# Patient Record
Sex: FEMALE | Race: WHITE | Employment: FULL TIME | URBAN - METROPOLITAN AREA
[De-identification: names, ages, dates, MRNs, and addresses within clinical notes are randomized per-mention and may not be internally consistent; named-entity substitution may affect disease eponyms.]

---

## 2019-11-01 ENCOUNTER — HOSPITAL ENCOUNTER (EMERGENCY)
Age: 24
Discharge: HOME | End: 2019-11-01
Attending: EMERGENCY MEDICINE
Payer: COMMERCIAL

## 2019-11-01 ENCOUNTER — APPOINTMENT (OUTPATIENT)
Dept: CT IMAGING | Age: 24
End: 2019-11-01
Payer: COMMERCIAL

## 2019-11-01 VITALS
HEART RATE: 82 BPM | OXYGEN SATURATION: 99 % | WEIGHT: 132 LBS | SYSTOLIC BLOOD PRESSURE: 124 MMHG | RESPIRATION RATE: 18 BRPM | HEIGHT: 62 IN | BODY MASS INDEX: 24.29 KG/M2 | TEMPERATURE: 97.8 F | DIASTOLIC BLOOD PRESSURE: 92 MMHG

## 2019-11-01 DIAGNOSIS — N20.0 KIDNEY STONE: Primary | ICD-10-CM

## 2019-11-01 LAB
ALBUMIN SERPL-MCNC: 4.8 G/DL (ref 3.5–4.6)
ALP BLD-CCNC: 59 U/L (ref 40–130)
ALT SERPL-CCNC: 9 U/L (ref 0–33)
ANION GAP SERPL CALCULATED.3IONS-SCNC: 15 MEQ/L (ref 9–15)
AST SERPL-CCNC: 14 U/L (ref 0–35)
BACTERIA: ABNORMAL /HPF
BASOPHILS ABSOLUTE: 0.1 K/UL (ref 0–0.2)
BASOPHILS RELATIVE PERCENT: 0.7 %
BILIRUB SERPL-MCNC: 0.7 MG/DL (ref 0.2–0.7)
BILIRUBIN URINE: ABNORMAL
BLOOD, URINE: ABNORMAL
BUN BLDV-MCNC: 9 MG/DL (ref 6–20)
CALCIUM SERPL-MCNC: 9.7 MG/DL (ref 8.5–9.9)
CHLORIDE BLD-SCNC: 100 MEQ/L (ref 95–107)
CLARITY: ABNORMAL
CO2: 27 MEQ/L (ref 20–31)
COLOR: ABNORMAL
CREAT SERPL-MCNC: 0.82 MG/DL (ref 0.5–0.9)
EOSINOPHILS ABSOLUTE: 0.1 K/UL (ref 0–0.7)
EOSINOPHILS RELATIVE PERCENT: 1.1 %
EPITHELIAL CELLS, UA: ABNORMAL /HPF (ref 0–5)
GFR AFRICAN AMERICAN: >60
GFR NON-AFRICAN AMERICAN: >60
GLOBULIN: 2.9 G/DL (ref 2.3–3.5)
GLUCOSE BLD-MCNC: 107 MG/DL (ref 70–99)
GLUCOSE URINE: NEGATIVE MG/DL
HCT VFR BLD CALC: 42.4 % (ref 37–47)
HEMOGLOBIN: 15.1 G/DL (ref 12–16)
HYALINE CASTS: ABNORMAL /HPF (ref 0–5)
KETONES, URINE: NEGATIVE MG/DL
LEUKOCYTE ESTERASE, URINE: ABNORMAL
LYMPHOCYTES ABSOLUTE: 3 K/UL (ref 1–4.8)
LYMPHOCYTES RELATIVE PERCENT: 34.5 %
MCH RBC QN AUTO: 32.2 PG (ref 27–31.3)
MCHC RBC AUTO-ENTMCNC: 35.5 % (ref 33–37)
MCV RBC AUTO: 90.5 FL (ref 82–100)
MONOCYTES ABSOLUTE: 0.6 K/UL (ref 0.2–0.8)
MONOCYTES RELATIVE PERCENT: 6.9 %
NEUTROPHILS ABSOLUTE: 5 K/UL (ref 1.4–6.5)
NEUTROPHILS RELATIVE PERCENT: 56.8 %
NITRITE, URINE: POSITIVE
PDW BLD-RTO: 11.9 % (ref 11.5–14.5)
PH UA: 5.5 (ref 5–9)
PLATELET # BLD: 227 K/UL (ref 130–400)
POTASSIUM SERPL-SCNC: 3.7 MEQ/L (ref 3.4–4.9)
PROTEIN UA: 30 MG/DL
RBC # BLD: 4.69 M/UL (ref 4.2–5.4)
RBC UA: ABNORMAL /HPF (ref 0–5)
SODIUM BLD-SCNC: 142 MEQ/L (ref 135–144)
SPECIFIC GRAVITY UA: 1.02 (ref 1–1.03)
TOTAL PROTEIN: 7.7 G/DL (ref 6.3–8)
URINE REFLEX TO CULTURE: YES
UROBILINOGEN, URINE: 1 E.U./DL
WBC # BLD: 8.8 K/UL (ref 4.8–10.8)
WBC UA: ABNORMAL /HPF (ref 0–5)

## 2019-11-01 PROCEDURE — 85025 COMPLETE CBC W/AUTO DIFF WBC: CPT

## 2019-11-01 PROCEDURE — 6370000000 HC RX 637 (ALT 250 FOR IP): Performed by: EMERGENCY MEDICINE

## 2019-11-01 PROCEDURE — 36415 COLL VENOUS BLD VENIPUNCTURE: CPT

## 2019-11-01 PROCEDURE — 96375 TX/PRO/DX INJ NEW DRUG ADDON: CPT

## 2019-11-01 PROCEDURE — 6360000002 HC RX W HCPCS: Performed by: EMERGENCY MEDICINE

## 2019-11-01 PROCEDURE — 81001 URINALYSIS AUTO W/SCOPE: CPT

## 2019-11-01 PROCEDURE — 80053 COMPREHEN METABOLIC PANEL: CPT

## 2019-11-01 PROCEDURE — 99284 EMERGENCY DEPT VISIT MOD MDM: CPT

## 2019-11-01 PROCEDURE — 74176 CT ABD & PELVIS W/O CONTRAST: CPT

## 2019-11-01 PROCEDURE — 96374 THER/PROPH/DIAG INJ IV PUSH: CPT

## 2019-11-01 PROCEDURE — 87086 URINE CULTURE/COLONY COUNT: CPT

## 2019-11-01 RX ORDER — HYDROCODONE BITARTRATE AND ACETAMINOPHEN 5; 325 MG/1; MG/1
2 TABLET ORAL ONCE
Status: COMPLETED | OUTPATIENT
Start: 2019-11-01 | End: 2019-11-01

## 2019-11-01 RX ORDER — BUPROPION HYDROCHLORIDE 150 MG/1
150 TABLET, EXTENDED RELEASE ORAL DAILY
COMMUNITY

## 2019-11-01 RX ORDER — TAMSULOSIN HYDROCHLORIDE 0.4 MG/1
0.4 CAPSULE ORAL ONCE
Status: COMPLETED | OUTPATIENT
Start: 2019-11-01 | End: 2019-11-01

## 2019-11-01 RX ORDER — ONDANSETRON 2 MG/ML
4 INJECTION INTRAMUSCULAR; INTRAVENOUS ONCE
Status: COMPLETED | OUTPATIENT
Start: 2019-11-01 | End: 2019-11-01

## 2019-11-01 RX ORDER — TAMSULOSIN HYDROCHLORIDE 0.4 MG/1
0.4 CAPSULE ORAL DAILY
Qty: 5 CAPSULE | Refills: 0 | Status: SHIPPED | OUTPATIENT
Start: 2019-11-01 | End: 2019-11-11 | Stop reason: ALTCHOICE

## 2019-11-01 RX ORDER — HYDROCODONE BITARTRATE AND ACETAMINOPHEN 5; 325 MG/1; MG/1
1 TABLET ORAL EVERY 6 HOURS PRN
Qty: 20 TABLET | Refills: 0 | Status: SHIPPED | OUTPATIENT
Start: 2019-11-01 | End: 2019-11-06

## 2019-11-01 RX ORDER — KETOROLAC TROMETHAMINE 30 MG/ML
30 INJECTION, SOLUTION INTRAMUSCULAR; INTRAVENOUS ONCE
Status: COMPLETED | OUTPATIENT
Start: 2019-11-01 | End: 2019-11-01

## 2019-11-01 RX ADMIN — ONDANSETRON 4 MG: 2 INJECTION INTRAMUSCULAR; INTRAVENOUS at 03:43

## 2019-11-01 RX ADMIN — HYDROCODONE BITARTRATE AND ACETAMINOPHEN 2 TABLET: 5; 325 TABLET ORAL at 04:59

## 2019-11-01 RX ADMIN — KETOROLAC TROMETHAMINE 30 MG: 30 INJECTION, SOLUTION INTRAMUSCULAR; INTRAVENOUS at 03:43

## 2019-11-01 RX ADMIN — TAMSULOSIN HYDROCHLORIDE 0.4 MG: 0.4 CAPSULE ORAL at 04:59

## 2019-11-01 SDOH — HEALTH STABILITY: MENTAL HEALTH: HOW OFTEN DO YOU HAVE A DRINK CONTAINING ALCOHOL?: NEVER

## 2019-11-01 ASSESSMENT — PAIN DESCRIPTION - DESCRIPTORS
DESCRIPTORS: SHARP
DESCRIPTORS: SHARP

## 2019-11-01 ASSESSMENT — PAIN DESCRIPTION - PAIN TYPE
TYPE: ACUTE PAIN
TYPE: ACUTE PAIN

## 2019-11-01 ASSESSMENT — PAIN DESCRIPTION - ORIENTATION
ORIENTATION: RIGHT
ORIENTATION: RIGHT

## 2019-11-01 ASSESSMENT — ENCOUNTER SYMPTOMS
SORE THROAT: 0
COUGH: 0
EYE DISCHARGE: 0
WHEEZING: 0
VOMITING: 0
ABDOMINAL PAIN: 0
ABDOMINAL DISTENTION: 0
CHEST TIGHTNESS: 0
SHORTNESS OF BREATH: 0
PHOTOPHOBIA: 0

## 2019-11-01 ASSESSMENT — PAIN SCALES - GENERAL
PAINLEVEL_OUTOF10: 8
PAINLEVEL_OUTOF10: 3
PAINLEVEL_OUTOF10: 3
PAINLEVEL_OUTOF10: 8

## 2019-11-01 ASSESSMENT — PAIN DESCRIPTION - LOCATION
LOCATION: FLANK
LOCATION: FLANK

## 2019-11-02 LAB — URINE CULTURE, ROUTINE: NORMAL

## 2019-11-06 ENCOUNTER — TELEPHONE (OUTPATIENT)
Dept: UROLOGY | Age: 24
End: 2019-11-06

## 2019-11-06 DIAGNOSIS — N20.0 KIDNEY STONE: Primary | ICD-10-CM

## 2019-11-11 ENCOUNTER — OFFICE VISIT (OUTPATIENT)
Dept: UROLOGY | Age: 24
End: 2019-11-11
Payer: COMMERCIAL

## 2019-11-11 ENCOUNTER — HOSPITAL ENCOUNTER (OUTPATIENT)
Dept: GENERAL RADIOLOGY | Age: 24
Discharge: HOME | End: 2019-11-13
Payer: COMMERCIAL

## 2019-11-11 VITALS
BODY MASS INDEX: 23.04 KG/M2 | WEIGHT: 130 LBS | HEART RATE: 83 BPM | HEIGHT: 63 IN | SYSTOLIC BLOOD PRESSURE: 108 MMHG | DIASTOLIC BLOOD PRESSURE: 68 MMHG

## 2019-11-11 DIAGNOSIS — N20.0 KIDNEY STONE: Primary | ICD-10-CM

## 2019-11-11 DIAGNOSIS — N20.0 KIDNEY STONE: ICD-10-CM

## 2019-11-11 LAB — PARATHYROID HORMONE INTACT: 20.6 PG/ML (ref 15–65)

## 2019-11-11 PROCEDURE — 99203 OFFICE O/P NEW LOW 30 MIN: CPT | Performed by: UROLOGY

## 2019-11-11 PROCEDURE — 74018 RADEX ABDOMEN 1 VIEW: CPT

## 2019-11-11 RX ORDER — TAMSULOSIN HYDROCHLORIDE 0.4 MG/1
0.4 CAPSULE ORAL DAILY
Qty: 10 CAPSULE | Refills: 0 | Status: SHIPPED | OUTPATIENT
Start: 2019-11-11 | End: 2020-11-12

## 2019-11-14 LAB
CALCULI COMPOSITION: NORMAL
MASS: 22 MG
STONE DESCRIPTION: NORMAL
STONE NUMBER: 1
STONE SIZE: NORMAL MM

## 2020-05-11 ENCOUNTER — HOSPITAL ENCOUNTER (OUTPATIENT)
Dept: GENERAL RADIOLOGY | Age: 25
Discharge: HOME | End: 2020-05-13
Payer: COMMERCIAL

## 2020-05-11 PROCEDURE — 74018 RADEX ABDOMEN 1 VIEW: CPT

## 2020-05-12 ENCOUNTER — VIRTUAL VISIT (OUTPATIENT)
Dept: UROLOGY | Age: 25
End: 2020-05-12
Payer: COMMERCIAL

## 2020-05-12 PROCEDURE — 99441 PR PHYS/QHP TELEPHONE EVALUATION 5-10 MIN: CPT | Performed by: UROLOGY

## 2020-05-12 PROCEDURE — 74018 RADEX ABDOMEN 1 VIEW: CPT | Performed by: UROLOGY

## 2020-11-12 ENCOUNTER — HOSPITAL ENCOUNTER (OUTPATIENT)
Dept: GENERAL RADIOLOGY | Age: 25
Discharge: HOME | End: 2020-11-14
Payer: COMMERCIAL

## 2020-11-12 ENCOUNTER — OFFICE VISIT (OUTPATIENT)
Dept: UROLOGY | Age: 25
End: 2020-11-12
Payer: COMMERCIAL

## 2020-11-12 VITALS
HEART RATE: 91 BPM | BODY MASS INDEX: 25.21 KG/M2 | SYSTOLIC BLOOD PRESSURE: 112 MMHG | HEIGHT: 62 IN | DIASTOLIC BLOOD PRESSURE: 68 MMHG | WEIGHT: 137 LBS

## 2020-11-12 PROCEDURE — 99213 OFFICE O/P EST LOW 20 MIN: CPT | Performed by: UROLOGY

## 2020-11-12 PROCEDURE — 74018 RADEX ABDOMEN 1 VIEW: CPT

## 2020-11-12 NOTE — PROGRESS NOTES
Forced sexual activity: None   Other Topics Concern    None   Social History Narrative    None     History reviewed. No pertinent family history. Current Outpatient Medications   Medication Sig Dispense Refill    buPROPion (WELLBUTRIN SR) 150 MG extended release tablet Take 150 mg by mouth daily       No current facility-administered medications for this visit. Sulfa antibiotics  All reviewed and verified by Dr Jamie Tripp on today's visit    No results found for: PSA, PSADIA  No results found for this visit on 11/12/20. Physical Exam  Vitals:    11/12/20 1039   BP: 112/68   Pulse: 91   Weight: 137 lb (62.1 kg)   Height: 5' 2\" (1.575 m)     Constitutional: Not in distress. Head and Neck: Normal  Cardiovascular: Normal rate, BP reviewed. Normal rhythm  Pulmonary/Chest: Normal respiratory effort not short of breath  Abdominal: Not distended. Denies abdominal pain  Urologic Exam  X-rays reviewed. Urologic exam unchanged. .  Musculoskeletal: Ambulatory. Extremities: No abnormalities  Neurological: Intact no deficits  Skin: Normal  Psychiatric: Alert and oriented x3 normal affect. Assessment/Medical Necessity-Decision Making  History of bilateral nephrocalcinosis, nephrolithiasis all stones under 2 mm in size patient has had no renal colic  KUB shows no change in size or position of calcifications involving both kidneys  Plan  Patient has supply of pain medication and tamsulosin  No plans on performing shockwave lithotripsy at this time  Follow-up 1 year with KUB  Greater than 50% of 20 minutes spent consulting patient face-to-face  Orders Placed This Encounter   Procedures    XR ABDOMEN (KUB) (SINGLE AP VIEW)     Standing Status:   Future     Standing Expiration Date:   11/12/2021     No orders of the defined types were placed in this encounter.     Alpesh Tsang MD       Please note this report has been partially produced using speech recognition software  And may cause contain errors related to that system including grammar, punctuation and spelling as well as words and phrases that may seem inappropriate. If there are questions or concerns please feel free to contact me to clarify.

## 2025-02-11 ENCOUNTER — OFFICE VISIT (OUTPATIENT)
Dept: URGENT CARE | Age: 30
End: 2025-02-11
Payer: COMMERCIAL

## 2025-02-11 ENCOUNTER — OFFICE VISIT (OUTPATIENT)
Dept: ORTHOPEDIC SURGERY | Facility: CLINIC | Age: 30
End: 2025-02-11
Payer: COMMERCIAL

## 2025-02-11 ENCOUNTER — ANCILLARY PROCEDURE (OUTPATIENT)
Dept: URGENT CARE | Age: 30
End: 2025-02-11
Payer: COMMERCIAL

## 2025-02-11 VITALS
HEIGHT: 62 IN | OXYGEN SATURATION: 98 % | WEIGHT: 160 LBS | BODY MASS INDEX: 29.44 KG/M2 | TEMPERATURE: 98.2 F | RESPIRATION RATE: 20 BRPM | DIASTOLIC BLOOD PRESSURE: 91 MMHG | SYSTOLIC BLOOD PRESSURE: 127 MMHG | HEART RATE: 108 BPM

## 2025-02-11 DIAGNOSIS — S42.201A CLOSED FRACTURE OF PROXIMAL END OF RIGHT HUMERUS, UNSPECIFIED FRACTURE MORPHOLOGY, INITIAL ENCOUNTER: Primary | ICD-10-CM

## 2025-02-11 DIAGNOSIS — M79.601 PAIN OF RIGHT UPPER EXTREMITY: ICD-10-CM

## 2025-02-11 DIAGNOSIS — M79.601 PAIN OF RIGHT UPPER EXTREMITY: Primary | ICD-10-CM

## 2025-02-11 DIAGNOSIS — S42.201A CLOSED FRACTURE OF PROXIMAL END OF RIGHT HUMERUS, UNSPECIFIED FRACTURE MORPHOLOGY, INITIAL ENCOUNTER: ICD-10-CM

## 2025-02-11 PROCEDURE — 73060 X-RAY EXAM OF HUMERUS: CPT | Mod: RIGHT SIDE | Performed by: NURSE PRACTITIONER

## 2025-02-11 PROCEDURE — L3670 SO ACRO/CLAV CAN WEB PRE OTS: HCPCS | Performed by: STUDENT IN AN ORGANIZED HEALTH CARE EDUCATION/TRAINING PROGRAM

## 2025-02-11 PROCEDURE — 99204 OFFICE O/P NEW MOD 45 MIN: CPT | Performed by: STUDENT IN AN ORGANIZED HEALTH CARE EDUCATION/TRAINING PROGRAM

## 2025-02-11 PROCEDURE — 1036F TOBACCO NON-USER: CPT | Performed by: STUDENT IN AN ORGANIZED HEALTH CARE EDUCATION/TRAINING PROGRAM

## 2025-02-11 RX ORDER — METFORMIN HYDROCHLORIDE 500 MG/1
1000 TABLET ORAL
COMMUNITY

## 2025-02-11 RX ORDER — NAPROXEN 500 MG/1
500 TABLET ORAL
Qty: 28 TABLET | Refills: 1 | Status: SHIPPED | OUTPATIENT
Start: 2025-02-11 | End: 2025-03-11

## 2025-02-11 RX ORDER — BUPROPION HYDROCHLORIDE 150 MG/1
150 TABLET, EXTENDED RELEASE ORAL DAILY
COMMUNITY

## 2025-02-11 RX ORDER — OXYCODONE AND ACETAMINOPHEN 5; 325 MG/1; MG/1
1 TABLET ORAL EVERY 8 HOURS PRN
Qty: 12 TABLET | Refills: 0 | Status: SHIPPED | OUTPATIENT
Start: 2025-02-11 | End: 2025-02-15

## 2025-02-11 ASSESSMENT — ENCOUNTER SYMPTOMS
ALLERGIC/IMMUNOLOGIC NEGATIVE: 1
GASTROINTESTINAL NEGATIVE: 1
ARTHRALGIAS: 1
EYES NEGATIVE: 1
CONSTITUTIONAL NEGATIVE: 1
JOINT SWELLING: 1
RESPIRATORY NEGATIVE: 1
NEUROLOGICAL NEGATIVE: 1

## 2025-02-11 NOTE — PROGRESS NOTES
"Subjective   Patient ID: Ronn Dickson is a 29 y.o. female. They present today with a chief complaint of Arm Injury ([place]ell [place] waterpark on Saturday. Limited ROM. ).    History of Present Illness  29 y.o.female who presents today with new complaints of right arm injury.  On Saturday, when she is [place] the [place] park, she was walking in her crocs, tripped, and fell into the railing. Braced fall with the right arm, she also hit her face.  She does have some bruising about her eye, but has no issues with her vision. Denies any numbness and tingling.  She denies any wrist pain and elbow pain.           Past Medical History  Allergies as of 02/11/2025 - Reviewed 02/11/2025   Allergen Reaction Noted    Sulfa (sulfonamide antibiotics) Hives 11/01/2019       (Not in a hospital admission)       History reviewed. No pertinent past medical history.    History reviewed. No pertinent surgical history.     reports that she has never smoked. She has never used smokeless tobacco. She reports that she does not use drugs.    Review of Systems  Review of Systems   Constitutional: Negative.    HENT: Negative.     Eyes: Negative.    Respiratory: Negative.     Gastrointestinal: Negative.    Musculoskeletal:  Positive for arthralgias and joint swelling.   Skin: Negative.    Allergic/Immunologic: Negative.    Neurological: Negative.                                   Objective    Vitals:    02/11/25 0908   BP: (!) 127/91   BP Location: Right arm   Patient Position: Sitting   BP Cuff Size: Adult   Pulse: 108   Resp: 20   Temp: 36.8 °C (98.2 °F)   TempSrc: Temporal   SpO2: 98%   Weight: 72.6 kg (160 lb)   Height: 1.575 m (5' 2\")     No LMP recorded.    Physical Exam  Constitutional:       Appearance: Normal appearance.   HENT:      Head: Normocephalic and atraumatic.      Right Ear: Tympanic membrane normal.      Left Ear: Tympanic membrane normal.      Nose: Nose normal.   Pulmonary:      Effort: Pulmonary effort is normal.      Breath sounds: " Normal breath sounds.   Musculoskeletal:         General: Swelling, tenderness and signs of injury present.      Cervical back: Normal range of motion and neck supple.   Skin:     General: Skin is warm and dry.      Capillary Refill: Capillary refill takes less than 2 seconds.   Neurological:      General: No focal deficit present.      Mental Status: She is alert and oriented to person, place, and time.   Psychiatric:         Mood and Affect: Mood normal.         Behavior: Behavior normal.         Procedures    Point of Care Test & Imaging Results from this visit  No results found for this visit on 02/11/25.   XR humerus right    Result Date: 2/11/2025  Interpreted By:  Jayce Celeste, STUDY: XR HUMERUS RIGHT; ;  2/11/2025 9:49 am   INDICATION: Signs/Symptoms:pain/ fall.   ,M79.601 Pain in right arm   COMPARISON: None.   ACCESSION NUMBER(S): [id number]   ORDERING CLINICIAN: VICKIE WINN   FINDINGS: There is a comminuted fracture through the neck of the proximal right humerus. No dislocation. The glenoid and glenohumeral joint appears intact. AC joint is intact. No additional fractures are seen.       Comminuted fracture through the neck of the proximal right humerus   MACRO: None   Signed by: Jayce Celeste 2/11/2025 10:14 AM Dictation workstation:   [id number]     Diagnostic study results (if any) were reviewed by URMILA Haney.    Assessment/Plan   Allergies, medications, history, and pertinent labs/EKGs/Imaging reviewed by URMILA Haney.     Medical Decision Making     Comminuted fracture through the neck of the proximal right humerus. Referred to ortho clinic for further imaging and eval. Significant other taking to ortho now.   Orders and Diagnoses  Diagnoses and all orders for this visit:  Pain of right upper extremity  -     XR humerus right; Future  Closed displaced fracture of surgical neck of right humerus, unspecified fracture morphology, initial  encounter      Medical Admin Record      Patient disposition: Physician's Office    Electronically signed by URMILA Haney  10:39 AM

## 2025-02-11 NOTE — PROGRESS NOTES
Acute Injury New Patient Visit    HPI: Ronn is a 29 y.o.female who presents today with new complaints of right shoulder injury.  Unfortunately, on Saturday, when she is [place] the water park, she was walking in her crocs, tripped, and fell into the railing.  Trying to brace her fall with the right arm, it impacted the railing, and she also hit her face.  She does have some bruising about her eye, but has no issues with her vision.  She states that she rested over the weekend, did not think it was broken, but continued to have significant pain with movement, so went to the urgent care at [place] today and was found to have a proximal humerus fracture.  She denies any numbness and tingling.  She denies any wrist pain and elbow pain.  She denies any wrist drop.  She is right-hand dominant.  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    Plan: For this right proximal humerus fracture, we will obtain a stat CT of the right shoulder, place her in a well-fitted sling for comfort, provide Percocet for pain control as well as naproxen for anti-inflammatory effect and continue with ice, rest, and nonweightbearing status.  No fracture charge as this will be managed by the surgeon going forward.  Additionally, discussed conservative treatment measures including rest, ice, elevation, compression, and over-the-counter analgesia as needed and as appropriate.  Risks of NSAID use, steroid use, and muscle relaxers discussed in depth and considered in light of medical comorbidities.  Patient, and parent/guardian as applicable, understand agree with plan.  Follow-up: With Dr. Driss Stewart for results review of the CT scan  X-rays on follow-up: As directed by Dr. Stewart      Assessment:   Problem List Items Addressed This Visit    None  Visit Diagnoses       Closed fracture of proximal end of right humerus, unspecified fracture morphology, initial encounter    -  Primary    Relevant Medications    oxyCODONE-acetaminophen (Percocet) 5-325 mg tablet     naproxen (Naprosyn) 500 mg tablet    Other Relevant Orders    Sling    CT 3D reconstruction    CT shoulder right wo IV contrast            Diagnostics: Reviewed all relevant imaging including x-ray, MRI, CT, and US.  XR humerus right          Interpreted By:  Jayce Celeste,   STUDY:  XR HUMERUS RIGHT; ;  2/11/2025 9:49 am      INDICATION:  Signs/Symptoms:pain/ fall.      ,M79.601 Pain in right arm      COMPARISON:  None.      ACCESSION NUMBER(S):  [id number]      ORDERING CLINICIAN:  VICKIE WINN      FINDINGS:  There is a comminuted fracture through the neck of the proximal right  humerus. No dislocation. The glenoid and glenohumeral joint appears  intact. AC joint is intact. No additional fractures are seen.      IMPRESSION:  Comminuted fracture through the neck of the proximal right humerus      MACRO:  None      Signed by: Jayce Celeste 2/11/2025 10:14 AM  Dictation workstation:   [id number]           Procedure:  Procedures    Physical Exam:  GENERAL:  No obvious acute distress.  NEURO:  Distally neurovascularly intact.  Sensation intact to light touch.  Extremity: Right shoulder exam shows:  Skin is intact;  No erythema or warmth;  TENDER over the proximal humerus, but no tenderness over the elbow or the wrist with no motor deficit including wrist drop;  Mild edema or ecchymosis;  No clinical signs of infection; and  Neurovascularly intact.    Orders Placed This Encounter    Sling    CT 3D reconstruction    CT shoulder right wo IV contrast    oxyCODONE-acetaminophen (Percocet) 5-325 mg tablet    naproxen (Naprosyn) 500 mg tablet      At the conclusion of the visit there were no further questions by the patient/family regarding their plan of care.  Patient was instructed to call or return with any issues, questions, or concerns regarding their injury and/or treatment plan described above.     02/11/25 at 10:43 AM - Jose Cruz DO    Office: [phone]    This note was prepared using voice  recognition software.  The details of this note are correct and have been reviewed, and corrected to the best of my ability.  Some grammatical errors may persist related to the Dragon software.

## 2025-02-11 NOTE — LETTER
February 11, 2025     Patient: Ronn Dickson   YOB: 1995   Date of Visit: 2/11/2025       To Whom It May Concern:    Ronn Dickson was seen in my clinic on 2/11/2025 at 10:15 am. Please excuse Ronn for her absence from work on this day to make the appointment.  May return to work 2/17/25 with the following restrictions, light duty work only with no use of right arm for 6 weeks or until follow up visit.      If you have any questions or concerns, please don't hesitate to call.         Sincerely,         Jose Cruz DO        CC: No Recipients

## 2025-02-13 ENCOUNTER — ANCILLARY PROCEDURE (OUTPATIENT)
Facility: HOSPITAL | Age: 30
End: 2025-02-13
Payer: COMMERCIAL

## 2025-02-13 DIAGNOSIS — S42.201A CLOSED FRACTURE OF PROXIMAL END OF RIGHT HUMERUS, UNSPECIFIED FRACTURE MORPHOLOGY, INITIAL ENCOUNTER: ICD-10-CM

## 2025-02-13 PROCEDURE — 73200 CT UPPER EXTREMITY W/O DYE: CPT | Mod: RIGHT SIDE | Performed by: STUDENT IN AN ORGANIZED HEALTH CARE EDUCATION/TRAINING PROGRAM

## 2025-02-13 PROCEDURE — 73200 CT UPPER EXTREMITY W/O DYE: CPT | Mod: RT

## 2025-02-13 PROCEDURE — 76377 3D RENDER W/INTRP POSTPROCES: CPT | Mod: RIGHT SIDE | Performed by: STUDENT IN AN ORGANIZED HEALTH CARE EDUCATION/TRAINING PROGRAM

## 2025-02-13 PROCEDURE — 76377 3D RENDER W/INTRP POSTPROCES: CPT

## 2025-02-17 NOTE — PROGRESS NOTES
History of Present Illness   No chief complaint on file.      Patient presents today for follow up evaluation of side: right upper extremity fracture.    The patient has been treated nonoperatively in a  sling .   The patient fell approximately a week and a half ago.  She saw my partner who ordered a CT scan and placed her in a sling.  She is here for follow-up.  The patient notes improvements in their pain     No past medical history on file.    Medication Documentation Review Audit       Reviewed by URMILA Haney (Nurse Practitioner) on 02/11/25 at 1023      Medication Order Taking? Sig Documenting Provider Last Dose Status   buPROPion SR (Wellbutrin SR) 150 mg 12 hr tablet [id number]  Take 150 mg by mouth once daily. Historical Provider, MD  Active   metFORMIN (Glucophage) 500 mg tablet [id number]  Take 1,000 mg by mouth 2 times daily (morning and late afternoon). Historical Provider, MD  Active                    Allergies   Allergen Reactions    Sulfa (Sulfonamide Antibiotics) Hives       Social History     Socioeconomic History    Marital status: Single     Spouse name: Not on file    Number of children: Not on file    Years of education: Not on file    Highest education level: Not on file   Occupational History    Not on file   Tobacco Use    Smoking status: Never    Smokeless tobacco: Never   Substance and Sexual Activity    Alcohol use: Not on file    Drug use: Never    Sexual activity: Not on file   Other Topics Concern    Not on file   Social History Narrative    Not on file     Social Drivers of Health     Financial Resource Strain: Low Risk  (2/5/2024)    Received from [facility]    Overall Financial Resource Strain (CARDIA)     Difficulty of Paying Living Expenses: Not hard at all   Food Insecurity: No Food Insecurity (2/5/2024)    Received from [facility]    Hunger Vital Sign     Worried About Running Out of Food in the Last Year: Never true     Ran Out of Food in the Last  Year: Never true   Transportation Needs: No Transportation Needs (2/5/2024)    Received from [facility]    PRAPARE - Transportation     Lack of Transportation (Medical): No     Lack of Transportation (Non-Medical): No   Physical Activity: Insufficiently Active (2/5/2024)    Received from [facility]    Exercise Vital Sign     Days of Exercise per Week: 3 days     Minutes of Exercise per Session: 30 min   Stress: Stress Concern Present (2/5/2024)    Received from [facility]    [employer] [place] [employer] - Occupational Stress Questionnaire     Feeling of Stress : Very much   Social Connections: Moderately Isolated (2/5/2024)    Received from [facility]    Social Connection and Isolation Panel [NHANES]     Frequency of Communication with Friends and Family: More than three times a week     Frequency of Social Gatherings with Friends and Family: Once a week     Attends Uatsdin Services: Never     Active Member of Clubs or Organizations: No     Attends Club or Organization Meetings: Patient declined     Marital Status: Living with partner   Intimate Partner Violence: Not on file   Housing Stability: Low Risk  (2/5/2024)    Received from [facility]    Housing Stability Vital Sign     Unable to Pay for Housing in the Last Year: No     Number of Places Lived in the Last Year: 1     Unstable Housing in the Last Year: No       No past surgical history on file.       Review of Systems   GENERAL: Negative  GI: Negative  MUSCULOSKELETAL: See HPI  SKIN: Negative  NEURO:  Negative     Physical Exam:  side: right upper extremity:  No tenderness over the clavicle.  She has pain with motion of the right shoulder.  Right elbow and wrist were nontender to palpation or motion.  Intact flexion and extension of 1st IP joint and finger abduction  Sensation intact to light touch medial / ulnar and radial nerve distribution   Good cap refill     Imaging  CT shoulder right wo IV contrast, CT 3D  reconstruction  Narrative: Interpreted By:  Gee Back,   STUDY:  CT SHOULDER RIGHT WO IV CONTRAST;  2/13/2025 11:36 am      INDICATION:  Signs/Symptoms:pain.      COMPARISON:  Radiographs 02/11/2025.      ACCESSION NUMBER(S):  [id number]      ORDERING CLINICIAN:  ALEKSANDR REARDON      TECHNIQUE:  CT imaging of the right shoulder was obtained without administration  of intravenous contrast medium. Coronal and sagittal reformatted  images were performed. 3D reformatted images were created and  reviewed at an independent workstation.      FINDINGS:  OSSEOUS STRUCTURES:  Comminuted fracture of the proximal humerus involving the proximal  humeral shaft just distal to the surgical neck, lesser and greater  trochanters. The distal fragment is displaced anteriorly by  approximately 8 mm (series 202, image 75). A fragment of the  posterolateral cortex measuring 1.4 cm is mildly displaced laterally  (series 201, image 17). There is mild lateral apex angulation at the  fracture site resulting in a mild varus alignment. The glenohumeral  articulation is maintained. The acromioclavicular joint is  unremarkable. No additional fracture is identified.      SOFT TISSUES:  Mild edema and/or trace hemorrhagic fluid medial to the fracture  extending into the axillary region. No organized subcutaneous or  intramuscular fluid collection. Muscle bulk is maintained. Imaged  right lung is clear.      Impression: Comminuted, mildly displaced and angulated proximal humeral fracture  as described.      MACRO:  None      Signed by: Gee Back 2/13/2025 2:03 PM  Dictation workstation:   [id number]         Assessment   Patient with an acute side: right proximal humerus fracture      Plan:  We discussed surgical versus nonsurgical management.  The angulation is well within acceptable limits.  She would like to continue with conservative treatment.  Continue use of  sling  Weightbearing status: NWB  Follow-up 1 week with xrays   Work  restriction to work from home for 3 weeks  All questions answered

## 2025-02-18 ENCOUNTER — APPOINTMENT (OUTPATIENT)
Dept: ORTHOPEDIC SURGERY | Facility: CLINIC | Age: 30
End: 2025-02-18
Payer: COMMERCIAL

## 2025-02-18 DIAGNOSIS — S42.201A CLOSED FRACTURE OF PROXIMAL END OF RIGHT HUMERUS, UNSPECIFIED FRACTURE MORPHOLOGY, INITIAL ENCOUNTER: Primary | ICD-10-CM

## 2025-02-18 PROCEDURE — 23600 CLTX PROX HUMRL FX W/O MNPJ: CPT | Performed by: ORTHOPAEDIC SURGERY

## 2025-02-18 NOTE — LETTER
February 18, 2025     Patient: Ronn Dickson   YOB: 1995   Date of Visit: 2/18/2025       To Whom It May Concern:    It is my medical opinion that Ronn Dickson  is to only work from home for the next three weeks due to her condition .    If you have any questions or concerns, please don't hesitate to call.         Sincerely,        Driss Stewart MD    CC: No Recipients

## 2025-02-21 DIAGNOSIS — S42.201A CLOSED FRACTURE OF PROXIMAL END OF RIGHT HUMERUS, UNSPECIFIED FRACTURE MORPHOLOGY, INITIAL ENCOUNTER: Primary | ICD-10-CM

## 2025-02-24 NOTE — PROGRESS NOTES
History of Present Illness   No chief complaint on file.      Patient presents today for follow up evaluation of side: right proximal humerus fracture.    The patient has been treated nonoperatively in a  sling .     The patient notes improvements in their pain  She does note an episode when she rolled over in bed and felt a pop.  She notes she has had more pain over the last few days.     No past medical history on file.    Medication Documentation Review Audit       Reviewed by URMILA Haney (Nurse Practitioner) on 02/11/25 at 1023      Medication Order Taking? Sig Documenting Provider Last Dose Status   buPROPion SR (Wellbutrin SR) 150 mg 12 hr tablet [id number]  Take 150 mg by mouth once daily. Historical Provider, MD  Active   metFORMIN (Glucophage) 500 mg tablet [id number]  Take 1,000 mg by mouth 2 times daily (morning and late afternoon). Historical Provider, MD  Active                    Allergies   Allergen Reactions    Sulfa (Sulfonamide Antibiotics) Hives       Social History     Socioeconomic History    Marital status: Single     Spouse name: Not on file    Number of children: Not on file    Years of education: Not on file    Highest education level: Not on file   Occupational History    Not on file   Tobacco Use    Smoking status: Never    Smokeless tobacco: Never   Substance and Sexual Activity    Alcohol use: Not on file    Drug use: Never    Sexual activity: Not on file   Other Topics Concern    Not on file   Social History Narrative    Not on file     Social Drivers of Health     Financial Resource Strain: Low Risk  (2/5/2024)    Received from [facility]    Overall Financial Resource Strain (CARDIA)     Difficulty of Paying Living Expenses: Not hard at all   Food Insecurity: No Food Insecurity (2/5/2024)    Received from [facility]    Hunger Vital Sign     Worried About Running Out of Food in the Last Year: Never true     Ran Out of Food in the Last Year: Never true    Transportation Needs: No Transportation Needs (2/5/2024)    Received from [facility]    PRAPARE - Transportation     Lack of Transportation (Medical): No     Lack of Transportation (Non-Medical): No   Physical Activity: Insufficiently Active (2/5/2024)    Received from [facility]    Exercise Vital Sign     Days of Exercise per Week: 3 days     Minutes of Exercise per Session: 30 min   Stress: Stress Concern Present (2/5/2024)    Received from [facility]    [place] [place] of Occupational Health - Occupational Stress Questionnaire     Feeling of Stress : Very much   Social Connections: Moderately Isolated (2/5/2024)    Received from [facility]    Social Connection and Isolation Panel [NHANES]     Frequency of Communication with Friends and Family: More than three times a week     Frequency of Social Gatherings with Friends and Family: Once a week     Attends Hinduism Services: Never     Active Member of Clubs or Organizations: No     Attends Club or Organization Meetings: Patient declined     Marital Status: Living with partner   Intimate Partner Violence: Not on file   Housing Stability: Low Risk  (2/5/2024)    Received from [facility]    Housing Stability Vital Sign     Unable to Pay for Housing in the Last Year: No     Number of Places Lived in the Last Year: 1     Unstable Housing in the Last Year: No       No past surgical history on file.       Review of Systems   GENERAL: Negative  GI: Negative  MUSCULOSKELETAL: See HPI  SKIN: Negative  NEURO:  Negative     Physical Exam:  side: right upper extremity:  Skin healthy to gross inspection, no breakdown  Swelling / ecchymosis noted  Tenderness to palpation over the anterior humerus  Intact flexion and extension of 1st IP joint and finger abduction  Sensation intact to light touch medial / ulnar and radial nerve distribution   Good cap refill     Imaging  CT shoulder right wo IV contrast, CT 3D reconstruction  Narrative:  Interpreted By:  Gee Back,   STUDY:  CT SHOULDER RIGHT WO IV CONTRAST;  2/13/2025 11:36 am      INDICATION:  Signs/Symptoms:pain.      COMPARISON:  Radiographs 02/11/2025.      ACCESSION NUMBER(S):  [id number]      ORDERING CLINICIAN:  ALEKSANDR REARDON      TECHNIQUE:  CT imaging of the right shoulder was obtained without administration  of intravenous contrast medium. Coronal and sagittal reformatted  images were performed. 3D reformatted images were created and  reviewed at an independent workstation.      FINDINGS:  OSSEOUS STRUCTURES:  Comminuted fracture of the proximal humerus involving the proximal  humeral shaft just distal to the surgical neck, lesser and greater  trochanters. The distal fragment is displaced anteriorly by  approximately 8 mm (series 202, image 75). A fragment of the  posterolateral cortex measuring 1.4 cm is mildly displaced laterally  (series 201, image 17). There is mild lateral apex angulation at the  fracture site resulting in a mild varus alignment. The glenohumeral  articulation is maintained. The acromioclavicular joint is  unremarkable. No additional fracture is identified.      SOFT TISSUES:  Mild edema and/or trace hemorrhagic fluid medial to the fracture  extending into the axillary region. No organized subcutaneous or  intramuscular fluid collection. Muscle bulk is maintained. Imaged  right lung is clear.      Impression: Comminuted, mildly displaced and angulated proximal humeral fracture  as described.      MACRO:  None      Signed by: Gee Back 2/13/2025 2:03 PM  Dictation workstation:   [id number]         Assessment   Patient with an acute side: right proximal humerus fracture      Plan:  Ibuprofen 800 mg  The sling is in good condition without any evidence of wear breakdown  Continue use of  sling  Weightbearing status: NWB  Follow-up 1 month with xrays out of sling.  All questions answered

## 2025-02-25 ENCOUNTER — HOSPITAL ENCOUNTER (OUTPATIENT)
Dept: RADIOLOGY | Facility: HOSPITAL | Age: 30
Discharge: HOME | End: 2025-02-25
Payer: COMMERCIAL

## 2025-02-25 ENCOUNTER — APPOINTMENT (OUTPATIENT)
Dept: ORTHOPEDIC SURGERY | Facility: CLINIC | Age: 30
End: 2025-02-25
Payer: COMMERCIAL

## 2025-02-25 VITALS — BODY MASS INDEX: 29.44 KG/M2 | HEIGHT: 62 IN | WEIGHT: 160 LBS

## 2025-02-25 DIAGNOSIS — S42.201A CLOSED FRACTURE OF PROXIMAL END OF RIGHT HUMERUS, UNSPECIFIED FRACTURE MORPHOLOGY, INITIAL ENCOUNTER: Primary | ICD-10-CM

## 2025-02-25 DIAGNOSIS — S42.201A CLOSED FRACTURE OF PROXIMAL END OF RIGHT HUMERUS, UNSPECIFIED FRACTURE MORPHOLOGY, INITIAL ENCOUNTER: ICD-10-CM

## 2025-02-25 PROCEDURE — 3008F BODY MASS INDEX DOCD: CPT | Performed by: ORTHOPAEDIC SURGERY

## 2025-02-25 PROCEDURE — 73030 X-RAY EXAM OF SHOULDER: CPT | Mod: RIGHT SIDE | Performed by: RADIOLOGY

## 2025-02-25 PROCEDURE — 73030 X-RAY EXAM OF SHOULDER: CPT | Mod: RT

## 2025-02-25 PROCEDURE — 99024 POSTOP FOLLOW-UP VISIT: CPT | Performed by: ORTHOPAEDIC SURGERY

## 2025-02-25 PROCEDURE — 1036F TOBACCO NON-USER: CPT | Performed by: ORTHOPAEDIC SURGERY

## 2025-02-25 RX ORDER — IBUPROFEN 800 MG/1
800 TABLET ORAL 3 TIMES DAILY
Qty: 90 TABLET | Refills: 0 | Status: SHIPPED | OUTPATIENT
Start: 2025-02-25 | End: 2025-03-27

## 2025-03-24 NOTE — PROGRESS NOTES
History of Present Illness   No chief complaint on file.      Patient presents today for follow up evaluation of side: right upper extremity fracture.    The patient has been treated nonoperatively in a  sling .     The patient notes improvements in their pain     No past medical history on file.    Medication Documentation Review Audit       Reviewed by Ximena Gan MA (Medical Assistant) on 02/25/25 at 0824      Medication Order Taking? Sig Documenting Provider Last Dose Status   buPROPion SR (Wellbutrin SR) 150 mg 12 hr tablet [id number]  Take 150 mg by mouth once daily. Historical Provider, MD  Active   ibuprofen 800 mg tablet [id number]  Take 1 tablet (800 mg) by mouth 3 times a day. Iván Naqvi PA-C  Active   metFORMIN (Glucophage) 500 mg tablet [id number]  Take 1,000 mg by mouth 2 times daily (morning and late afternoon). Historical Provider, MD  Active   naproxen (Naprosyn) 500 mg tablet [id number]  Take 1 tablet (500 mg) by mouth 2 times daily (morning and late afternoon) for 28 days. Jose Cruz,   Active                    Allergies   Allergen Reactions    Sulfa (Sulfonamide Antibiotics) Hives       Social History     Socioeconomic History    Marital status: Single     Spouse name: Not on file    Number of children: Not on file    Years of education: Not on file    Highest education level: Not on file   Occupational History    Not on file   Tobacco Use    Smoking status: Never    Smokeless tobacco: Never   Substance and Sexual Activity    Alcohol use: Not on file    Drug use: Never    Sexual activity: Not on file   Other Topics Concern    Not on file   Social History Narrative    Not on file     Social Drivers of Health     Financial Resource Strain: Low Risk  (2/5/2024)    Received from [facility]    Overall Financial Resource Strain (CARDIA)     Difficulty of Paying Living Expenses: Not hard at all   Food Insecurity: No Food Insecurity (2/5/2024)    Received from [facility]     Hunger Vital Sign     Worried About Running Out of Food in the Last Year: Never true     Ran Out of Food in the Last Year: Never true   Transportation Needs: No Transportation Needs (2/5/2024)    Received from [facility]    PRAPARE - Transportation     Lack of Transportation (Medical): No     Lack of Transportation (Non-Medical): No   Physical Activity: Insufficiently Active (2/5/2024)    Received from [facility]    Exercise Vital Sign     Days of Exercise per Week: 3 days     Minutes of Exercise per Session: 30 min   Stress: Stress Concern Present (2/5/2024)    Received from [facility]    [place] [employer] - Occupational Stress Questionnaire     Feeling of Stress : Very much   Social Connections: Moderately Isolated (2/5/2024)    Received from [facility]    Social Connection and Isolation Panel [NHANES]     Frequency of Communication with Friends and Family: More than three times a week     Frequency of Social Gatherings with Friends and Family: Once a week     Attends Faith Services: Never     Active Member of Clubs or Organizations: No     Attends Club or Organization Meetings: Patient declined     Marital Status: Living with partner   Intimate Partner Violence: Not on file   Housing Stability: Low Risk  (2/5/2024)    Received from [facility]    Housing Stability Vital Sign     Unable to Pay for Housing in the Last Year: No     Number of Places Lived in the Last Year: 1     Unstable Housing in the Last Year: No       No past surgical history on file.       Review of Systems   GENERAL: Negative  GI: Negative  MUSCULOSKELETAL: See HPI  SKIN: Negative  NEURO:  Negative     Physical Exam:  side: right upper extremity:  Skin healthy to gross inspection, no breakdown  No swelling or ecchymosis noted  There is no tenderness to palpation about the proximal humerus.  There is no crepitus with gentle shoulder motion.  Intact flexion and extension of 1st IP joint and  finger abduction  Sensation intact to light touch medial / ulnar and radial nerve distribution   Good cap refill     Imaging  XR shoulder right 2+ views  Narrative: Interpreted By:  Giovana Enamorado,   STUDY:  XR SHOULDER RIGHT 2+ VIEWS 2/25/2025 8:16 am      INDICATION:  Signs/Symptoms:pain with known fracture      COMPARISON:  02/11/2025      ACCESSION NUMBER(S):  [id number]      ORDERING CLINICIAN:  ROSEMARIE CEJA      TECHNIQUE:  Four views of right shoulder      FINDINGS:  There is a comminuted fracture of the surgical neck of the right  humerus extending into the humeral head with mild angulation at the  fracture site with the apex directed anterolaterally. When compared  with the prior study, no obvious callus formation is seen at this  time.      Impression: Comminuted fracture of the surgical neck of right humerus with  extension into the humeral head appearing unchanged since 02/11/2025.      Signed by: Giovana Enamorado 2/26/2025 12:42 PM  Dictation workstation:   [id number]         Assessment   Patient with a proximal humerus fracture     Plan:  May transition out of the sling  [place], pendulum and slide exercises as well as wall climbs.  Weightbearing status: NWB  Follow-up months for recheck and x-rays  All questions answered

## 2025-03-25 ENCOUNTER — APPOINTMENT (OUTPATIENT)
Dept: ORTHOPEDIC SURGERY | Facility: CLINIC | Age: 30
End: 2025-03-25
Payer: COMMERCIAL

## 2025-03-25 ENCOUNTER — HOSPITAL ENCOUNTER (OUTPATIENT)
Dept: RADIOLOGY | Facility: HOSPITAL | Age: 30
Discharge: HOME | End: 2025-03-25
Payer: COMMERCIAL

## 2025-03-25 DIAGNOSIS — S42.201A CLOSED FRACTURE OF PROXIMAL END OF RIGHT HUMERUS, UNSPECIFIED FRACTURE MORPHOLOGY, INITIAL ENCOUNTER: ICD-10-CM

## 2025-03-25 PROCEDURE — 73030 X-RAY EXAM OF SHOULDER: CPT | Mod: RT

## 2025-03-25 PROCEDURE — 73030 X-RAY EXAM OF SHOULDER: CPT | Mod: RIGHT SIDE

## 2025-03-25 PROCEDURE — 99024 POSTOP FOLLOW-UP VISIT: CPT | Performed by: ORTHOPAEDIC SURGERY

## 2025-04-15 DIAGNOSIS — S42.201A CLOSED FRACTURE OF PROXIMAL END OF RIGHT HUMERUS, UNSPECIFIED FRACTURE MORPHOLOGY, INITIAL ENCOUNTER: Primary | ICD-10-CM

## 2025-04-21 NOTE — PROGRESS NOTES
History of Present Illness   No chief complaint on file.      Patient presents today for follow up evaluation of side: right upper extremity fracture.    The patient has been treated nonoperatively in a  sling .     The patient notes improvements in their pain     Medical History[1]    Medication Documentation Review Audit       Reviewed by Georgina Lawrence MA (Medical Assistant) on 03/25/25 at 0844      Medication Order Taking? Sig Documenting Provider Last Dose Status   buPROPion SR (Wellbutrin SR) 150 mg 12 hr tablet [id number]  Take 150 mg by mouth once daily. Historical Provider, MD  Active   ibuprofen 800 mg tablet [id number]  Take 1 tablet (800 mg) by mouth 3 times a day. Iván Naqvi PA-C  Active   metFORMIN (Glucophage) 500 mg tablet [id number]  Take 1,000 mg by mouth 2 times daily (morning and late afternoon). Historical Provider, MD  Active                    RX Allergies[2]    Social History     Socioeconomic History    Marital status: Single     Spouse name: Not on file    Number of children: Not on file    Years of education: Not on file    Highest education level: Not on file   Occupational History    Not on file   Tobacco Use    Smoking status: Never    Smokeless tobacco: Never   Substance and Sexual Activity    Alcohol use: Not on file    Drug use: Never    Sexual activity: Not on file   Other Topics Concern    Not on file   Social History Narrative    Not on file     Social Drivers of Health     Financial Resource Strain: Low Risk  (2/5/2024)    Received from [facility]    Overall Financial Resource Strain (CARDIA)     Difficulty of Paying Living Expenses: Not hard at all   Food Insecurity: No Food Insecurity (2/5/2024)    Received from [facility]    Hunger Vital Sign     Worried About Running Out of Food in the Last Year: Never true     Ran Out of Food in the Last Year: Never true   Transportation Needs: No Transportation Needs (2/5/2024)    Received from [facility]    PRAPARE -  Transportation     Lack of Transportation (Medical): No     Lack of Transportation (Non-Medical): No   Physical Activity: Insufficiently Active (2/5/2024)    Received from [facility]    Exercise Vital Sign     Days of Exercise per Week: 3 days     Minutes of Exercise per Session: 30 min   Stress: Stress Concern Present (2/5/2024)    Received from [facility]    [place] of Occupational [employer] - Occupational Stress Questionnaire     Feeling of Stress : Very much   Social Connections: Moderately Isolated (2/5/2024)    Received from [facility]    Social Connection and Isolation Panel [NHANES]     Frequency of Communication with Friends and Family: More than three times a week     Frequency of Social Gatherings with Friends and Family: Once a week     Attends Congregational Services: Never     Active Member of Clubs or Organizations: No     Attends Club or Organization Meetings: Patient declined     Marital Status: Living with partner   Intimate Partner Violence: Not on file   Housing Stability: Low Risk  (2/5/2024)    Received from [facility]    Housing Stability Vital Sign     Unable to Pay for Housing in the Last Year: No     Number of Places Lived in the Last Year: 1     Unstable Housing in the Last Year: No       Surgical History[3]       Review of Systems   GENERAL: Negative  GI: Negative  MUSCULOSKELETAL: See HPI  SKIN: Negative  NEURO:  Negative     Physical Exam:  side: right upper extremity:  With gentle shoulder motion the fracture feels to be moving as 1.  She is able to initiate abduction.  Intact flexion and extension of 1st IP joint and finger abduction  Some pain with abduction  Sensation intact to light touch medial / ulnar and radial nerve distribution   Good cap refill     Imaging  See dictated report       Assessment   Patient with a right proximal humerus fracture fracture      Plan:  PT for range of motion  Wean out of sling  Weightbearing status: NWB  Follow-up 1 month  with xrays   All questions answered                     [1] No past medical history on file.  [2]   Allergies  Allergen Reactions    Sulfa (Sulfonamide Antibiotics) Hives   [3] No past surgical history on file.

## 2025-04-22 ENCOUNTER — APPOINTMENT (OUTPATIENT)
Dept: ORTHOPEDIC SURGERY | Facility: CLINIC | Age: 30
End: 2025-04-22
Payer: COMMERCIAL

## 2025-04-22 ENCOUNTER — HOSPITAL ENCOUNTER (OUTPATIENT)
Dept: RADIOLOGY | Facility: HOSPITAL | Age: 30
Discharge: HOME | End: 2025-04-22
Payer: COMMERCIAL

## 2025-04-22 DIAGNOSIS — S42.201A CLOSED FRACTURE OF PROXIMAL END OF RIGHT HUMERUS, UNSPECIFIED FRACTURE MORPHOLOGY, INITIAL ENCOUNTER: ICD-10-CM

## 2025-04-22 DIAGNOSIS — S42.201A CLOSED FRACTURE OF PROXIMAL END OF RIGHT HUMERUS, UNSPECIFIED FRACTURE MORPHOLOGY, INITIAL ENCOUNTER: Primary | ICD-10-CM

## 2025-04-22 PROCEDURE — 99024 POSTOP FOLLOW-UP VISIT: CPT | Performed by: ORTHOPAEDIC SURGERY

## 2025-04-22 PROCEDURE — 1036F TOBACCO NON-USER: CPT | Performed by: ORTHOPAEDIC SURGERY

## 2025-04-22 PROCEDURE — 73030 X-RAY EXAM OF SHOULDER: CPT | Mod: RT

## 2025-04-22 PROCEDURE — 73030 X-RAY EXAM OF SHOULDER: CPT | Mod: RIGHT SIDE | Performed by: RADIOLOGY

## 2025-05-07 ENCOUNTER — EVALUATION (OUTPATIENT)
Dept: PHYSICAL THERAPY | Facility: HOSPITAL | Age: 30
End: 2025-05-07
Payer: COMMERCIAL

## 2025-05-07 DIAGNOSIS — M25.511 CHRONIC RIGHT SHOULDER PAIN: Primary | ICD-10-CM

## 2025-05-07 DIAGNOSIS — S42.201A CLOSED FRACTURE OF PROXIMAL END OF RIGHT HUMERUS, UNSPECIFIED FRACTURE MORPHOLOGY, INITIAL ENCOUNTER: ICD-10-CM

## 2025-05-07 DIAGNOSIS — G89.29 CHRONIC RIGHT SHOULDER PAIN: Primary | ICD-10-CM

## 2025-05-07 DIAGNOSIS — R29.898 WEAKNESS OF RIGHT SHOULDER: ICD-10-CM

## 2025-05-07 PROCEDURE — 97110 THERAPEUTIC EXERCISES: CPT | Mod: GP | Performed by: PHYSICAL THERAPIST

## 2025-05-07 PROCEDURE — 97161 PT EVAL LOW COMPLEX 20 MIN: CPT | Mod: GP | Performed by: PHYSICAL THERAPIST

## 2025-05-07 ASSESSMENT — PAIN SCALES - GENERAL: PAINLEVEL_OUTOF10: 0 - NO PAIN

## 2025-05-07 ASSESSMENT — PAIN - FUNCTIONAL ASSESSMENT: PAIN_FUNCTIONAL_ASSESSMENT: 0-10

## 2025-05-07 NOTE — PROGRESS NOTES
Physical Therapy    Physical Therapy Evaluation and Treatment      Patient Name: Ronn Dickson  MRN: [id number]  Today's Date: 5/7/2025    Time Entry:   Time Calculation  Start Time: 0802  Stop Time: 0844  Time Calculation (min): 42 min  PT Evaluation Time Entry  PT Evaluation (Low) Time Entry: 25  PT Therapeutic Procedures Time Entry  Therapeutic Exercise Time Entry: 15                   Assessment:  This 29 yo pleasant female is present today with the diagnosis of right proximal humerus fracture.  She fell going down the stairs in Feb 2025 in her home.  Her pain levels are very low and no pain noted this morning.  No tenderness to her shoulder.   No difficulty sleeping at night.  She's right hand dominant.  Decreased Rt shoulder AROM, decreased strength and overall function about her Rt shoulder/UE.  Difficulty with her basic and instrumental ADL's and work activities around the house.  Pt given HEP and all questions answered.      Plan:  Continue skilled PT as needed.      Current Problem:   1. Chronic right shoulder pain  Follow Up In Physical Therapy      2. Closed fracture of proximal end of right humerus, unspecified fracture morphology, initial encounter  Referral to Physical Therapy    Follow Up In Physical Therapy      3. Weakness of right shoulder  Follow Up In Physical Therapy          Subjective   Pt reports her Rt shoulder is feeling ok and no pain.     Precautions:  Precautions  Precautions Comment: NWB'ing    Pain:  Pain Assessment  Pain Assessment: 0-10  0-10 (Numeric) Pain Score: 0 - No pain    Objective     AROM:  Rt shoulder flexion 30, abduction 30, ER 40 and IR 40.    PROM:  Rt shoulder flexion 120, abduction 120, ER 60 and IR 40.    Strength:  NT    Posture:  Mild forward, protracted scapulae    Palpation:  No tenderness about the Rt shoulder    Special Tests:  NT    Outcome Measures:  Other Measures  Disability of Arm Shoulder Hand (DASH): 18.18%     Treatments:    Wands:  supine - ER, CP and  flexion, 10 reps x 2 *  Standing - extension, back slides, 20 reps *      Goals:    No Rt shoulder pain.  Pain free WFL's Rt shoulder AROM.  5/5 Rt shoulder/scapular strength grossly throughout.  No tenderness about Rt shoulder.  No pain or difficulty performing basic/instrumental ADL's and work activities around the house.  Independent with HEP.

## 2025-05-16 ENCOUNTER — TREATMENT (OUTPATIENT)
Dept: PHYSICAL THERAPY | Facility: HOSPITAL | Age: 30
End: 2025-05-16
Payer: COMMERCIAL

## 2025-05-16 DIAGNOSIS — M25.511 CHRONIC RIGHT SHOULDER PAIN: ICD-10-CM

## 2025-05-16 DIAGNOSIS — R29.898 WEAKNESS OF RIGHT SHOULDER: Primary | ICD-10-CM

## 2025-05-16 DIAGNOSIS — G89.29 CHRONIC RIGHT SHOULDER PAIN: ICD-10-CM

## 2025-05-16 PROCEDURE — 97140 MANUAL THERAPY 1/> REGIONS: CPT | Mod: GP | Performed by: PHYSICAL THERAPIST

## 2025-05-16 PROCEDURE — 97110 THERAPEUTIC EXERCISES: CPT | Mod: GP | Performed by: PHYSICAL THERAPIST

## 2025-05-16 ASSESSMENT — PAIN SCALES - GENERAL: PAINLEVEL_OUTOF10: 0 - NO PAIN

## 2025-05-16 ASSESSMENT — PAIN - FUNCTIONAL ASSESSMENT: PAIN_FUNCTIONAL_ASSESSMENT: 0-10

## 2025-05-16 NOTE — PROGRESS NOTES
Physical Therapy    Physical Therapy Treatment    Patient Name: Ronn Dickson  MRN: [id number]  Today's Date: 5/16/2025    Time Entry:   Time Calculation  Start Time: 0700  Stop Time: 0740  Time Calculation (min): 40 min     PT Therapeutic Procedures Time Entry  Manual Therapy Time Entry: 10  Therapeutic Exercise Time Entry: 30                   Assessment:  Her ROM is better today.  Initiated pulleys, supine and side lying Rt shoulder AROM.  Fair tolerance with ROM ex's.  Pt given HEP.        Plan:  Continue with skilled PT      Current Problem  1. Weakness of right shoulder        2. Chronic right shoulder pain              Subjective  Pt states her Rt shoulder is feeling good, no pain this morning.     Precautions  Precautions  Precautions Comment: NWB'ing    Pain  Pain Assessment  Pain Assessment: 0-10  0-10 (Numeric) Pain Score: 0 - No pain    Objective     PROM in flexion and abduction 160, ER 70 and IR 40    AROM in supine, flexion to 120      Treatments:    Shoulder pulleys, flexion, 3 minutes    PROM    Supine flexion, 10 reps *  Scapular protraction, 10 reps x 2 *  Side lying abduction, 10 reps *  Side lying ER, 10 reps *

## 2025-05-19 DIAGNOSIS — S42.201A CLOSED FRACTURE OF PROXIMAL END OF RIGHT HUMERUS, UNSPECIFIED FRACTURE MORPHOLOGY, INITIAL ENCOUNTER: Primary | ICD-10-CM

## 2025-05-20 ENCOUNTER — HOSPITAL ENCOUNTER (OUTPATIENT)
Dept: RADIOLOGY | Facility: HOSPITAL | Age: 30
Discharge: HOME | End: 2025-05-20
Payer: COMMERCIAL

## 2025-05-20 ENCOUNTER — APPOINTMENT (OUTPATIENT)
Dept: ORTHOPEDIC SURGERY | Facility: CLINIC | Age: 30
End: 2025-05-20
Payer: COMMERCIAL

## 2025-05-20 DIAGNOSIS — S42.201A CLOSED FRACTURE OF PROXIMAL END OF RIGHT HUMERUS, UNSPECIFIED FRACTURE MORPHOLOGY, INITIAL ENCOUNTER: Primary | ICD-10-CM

## 2025-05-20 DIAGNOSIS — S42.201A CLOSED FRACTURE OF PROXIMAL END OF RIGHT HUMERUS, UNSPECIFIED FRACTURE MORPHOLOGY, INITIAL ENCOUNTER: ICD-10-CM

## 2025-05-20 PROCEDURE — 73030 X-RAY EXAM OF SHOULDER: CPT | Mod: RT

## 2025-05-20 PROCEDURE — 99213 OFFICE O/P EST LOW 20 MIN: CPT | Performed by: ORTHOPAEDIC SURGERY

## 2025-05-20 PROCEDURE — 73030 X-RAY EXAM OF SHOULDER: CPT | Mod: RIGHT SIDE | Performed by: RADIOLOGY

## 2025-05-20 NOTE — PROGRESS NOTES
History of Present Illness   No chief complaint on file.      Patient presents today for follow up evaluation of side: right proximal humerus fracture.    The patient has been treated nonoperatively with limited weight bearing.     The patient notes improvements in their pain  She has been doing physical therapy and notes improvements in her range of motion however she is still limited in forward flexion  Notes no pain     Medical History[1]    Medication Documentation Review Audit       Reviewed by Radha Luu on 04/22/25 at 0813      Medication Order Taking? Sig Documenting Provider Last Dose Status   buPROPion SR (Wellbutrin SR) 150 mg 12 hr tablet [id number]  Take 150 mg by mouth once daily. Historical Provider, MD  Active   metFORMIN (Glucophage) 500 mg tablet [id number]  Take 1,000 mg by mouth 2 times daily (morning and late afternoon). Historical Provider, MD  Active                    RX Allergies[2]    Social History     Socioeconomic History    Marital status: Single     Spouse name: Not on file    Number of children: Not on file    Years of education: Not on file    Highest education level: Not on file   Occupational History    Not on file   Tobacco Use    Smoking status: Never    Smokeless tobacco: Never   Substance and Sexual Activity    Alcohol use: Not on file    Drug use: Never    Sexual activity: Not on file   Other Topics Concern    Not on file   Social History Narrative    Not on file     Social Drivers of Health     Financial Resource Strain: Low Risk  (4/1/2025)    Received from [facility]    Overall Financial Resource Strain (CARDIA)     Difficulty of Paying Living Expenses: Not hard at all   Food Insecurity: No Food Insecurity (4/1/2025)    Received from [facility]    Hunger Vital Sign     Worried About Running Out of Food in the Last Year: Never true     Ran Out of Food in the Last Year: Never true   Transportation Needs: No Transportation Needs (4/1/2025)    Received from  [facility]    PRAPARE - Transportation     Lack of Transportation (Medical): No     Lack of Transportation (Non-Medical): No   Physical Activity: Insufficiently Active (4/1/2025)    Received from [facility]    Exercise Vital Sign     Days of Exercise per Week: 2 days     Minutes of Exercise per Session: 30 min   Stress: Stress Concern Present (4/1/2025)    Received from [facility]    [place] of Occupational Health - Occupational Stress Questionnaire     Feeling of Stress : To some extent   Social Connections: Moderately Isolated (4/1/2025)    Received from [facility]    Social Connection and Isolation Panel [NHANES]     Frequency of Communication with Friends and Family: More than three times a week     Frequency of Social Gatherings with Friends and Family: Once a week     Attends Mu-ism Services: Never     Active Member of Clubs or Organizations: No     Attends Club or Organization Meetings: Never     Marital Status: Living with partner   Intimate Partner Violence: Not on file   Housing Stability: Low Risk  (2/5/2024)    Received from [facility]    Housing Stability Vital Sign     Unable to Pay for Housing in the Last Year: No     Number of Places Lived in the Last Year: 1     Unstable Housing in the Last Year: No       Surgical History[3]       Review of Systems   GENERAL: Negative  GI: Negative  MUSCULOSKELETAL: See HPI  SKIN: Negative  NEURO:  Negative     Physical Exam:  side: right upper extremity:  Skin healthy to gross inspection, no breakdown  Range of motion is forward flexion to 90 degrees internal rotation to L1 external rotation to 80 degrees abduction to 85 degrees  She is able to raise her arm but has weakness.  Sensation intact to light touch medial / ulnar and radial nerve distribution   Good cap refill     Imaging  See dictated report         Assessment   Patient with a right proximal humerus fracture, healing     Plan:  Continue with physical therapy for  range of motion specifically forward flexion and strengthening  Weightbearing status: WBAT  Follow-up 2 month with xrays  All questions answered                     [1] No past medical history on file.  [2]   Allergies  Allergen Reactions    Sulfa (Sulfonamide Antibiotics) Hives   [3] No past surgical history on file.

## 2025-05-21 ENCOUNTER — TREATMENT (OUTPATIENT)
Dept: PHYSICAL THERAPY | Facility: HOSPITAL | Age: 30
End: 2025-05-21
Payer: COMMERCIAL

## 2025-05-21 DIAGNOSIS — M25.511 CHRONIC RIGHT SHOULDER PAIN: ICD-10-CM

## 2025-05-21 DIAGNOSIS — G89.29 CHRONIC RIGHT SHOULDER PAIN: ICD-10-CM

## 2025-05-21 DIAGNOSIS — R29.898 WEAKNESS OF RIGHT SHOULDER: Primary | ICD-10-CM

## 2025-05-21 PROCEDURE — 97110 THERAPEUTIC EXERCISES: CPT | Mod: GP | Performed by: PHYSICAL THERAPIST

## 2025-05-21 ASSESSMENT — PAIN - FUNCTIONAL ASSESSMENT: PAIN_FUNCTIONAL_ASSESSMENT: 0-10

## 2025-05-21 ASSESSMENT — PAIN SCALES - GENERAL: PAINLEVEL_OUTOF10: 0 - NO PAIN

## 2025-05-21 NOTE — PROGRESS NOTES
Physical Therapy    Physical Therapy Treatment    Patient Name: Ronn Dickson  MRN: [id number]  Today's Date: 5/21/2025    Time Entry:   Time Calculation  Start Time: 0505  Stop Time: 0547  Time Calculation (min): 42 min     PT Therapeutic Procedures Time Entry  Therapeutic Exercise Time Entry: 42                   Assessment:  She has updated orders for WBAT and initiate light strengthening.  Initiated more AROM and light strengthening ex's for total arm strength.  She labors some with standing shoulder flexion, scaption and abduction.  Her AROM is not functional at the current time.  Pt given HEP and no increased pain after PT today.      Plan:  Continue with skilled PT as needed.      Current Problem  1. Weakness of right shoulder        2. Chronic right shoulder pain              Subjective  Pt states her Rt shoulder is feeling better.     Precautions  Precautions  Precautions Comment: WBAT    Pain  Pain Assessment  Pain Assessment: 0-10  0-10 (Numeric) Pain Score: 0 - No pain    Objective     PROM in flexion and abduction 160, ER 70 and IR 40     AROM in standing, flexion 80 and abduction 70 degrees       Treatments:    UBE, F/R, 3 minutes  Shoulder pulleys, flexion, 3 minutes  Wall slides, flexion and scaption with some Lt UE assistance, 20-30 reps *       Supine flexion, 20 reps *  Scapular protraction, 10 reps x 2 *  Side lying abduction, 10 reps *  Side lying ER, 10 reps *  Side lying flexion and horizontal abduction, with some assistance    Triceps, GTB, 30 reps   Bicep curls, 30 reps   Rows and shoulder extension with scap retraction, GTB, 20-30 reps

## 2025-05-27 ENCOUNTER — TREATMENT (OUTPATIENT)
Dept: PHYSICAL THERAPY | Facility: HOSPITAL | Age: 30
End: 2025-05-27
Payer: COMMERCIAL

## 2025-05-27 DIAGNOSIS — R29.898 WEAKNESS OF RIGHT SHOULDER: Primary | ICD-10-CM

## 2025-05-27 DIAGNOSIS — G89.29 CHRONIC RIGHT SHOULDER PAIN: ICD-10-CM

## 2025-05-27 DIAGNOSIS — M25.511 CHRONIC RIGHT SHOULDER PAIN: ICD-10-CM

## 2025-05-27 DIAGNOSIS — S42.201A CLOSED FRACTURE OF PROXIMAL END OF RIGHT HUMERUS, UNSPECIFIED FRACTURE MORPHOLOGY, INITIAL ENCOUNTER: ICD-10-CM

## 2025-05-27 ASSESSMENT — PAIN SCALES - GENERAL: PAINLEVEL_OUTOF10: 0 - NO PAIN

## 2025-05-27 NOTE — PROGRESS NOTES
Physical Therapy Treatment  5/27/2025  Patient Name: Ronn Dickson  MRN: [id number]  Current Problem  1. Weakness of right shoulder        2. Chronic right shoulder pain        3. Closed fracture of proximal end of right humerus, unspecified fracture morphology, initial encounter          Insurance   [facility]  Visit 4  Subjective   Pt with some soreness, but overall doing well.   Precautions  Precautions Comment: WBAT  0-10 (Numeric) Pain Score: 0 - No pain  Objective    Treatments  UBE F/R, L2, 3 minutes each way  Wall slides, flexion and scaption, 20 reps  Rows and extensions, GTB, 20 reps  IR, GTB, 20 reps  ER step outs, YTB, 20 reps  Biceps and Triceps, GTB, 20 reps  Side-lying, ER (1 lb) and abduction (0 lb), 20 reps  Supine, flexion, 20 reps  Supine, chest press with plus, 1 lb, 20 reps    Assessment  Pt without issues ex's listed. Noted pain with any motion across body, sharp stabbing; so held, majority h.abduction ex's. Full passive motion, remains very limited active. Able to complete active flexion supine with good control. Updated HEP and reviewed.  Plan:  [1] Continue progressing ROM  [2]  [3]  [4]    OP EDUCATION:  Access Code: [email or web address]  URL: [email or web address]  Date: 05/27/2025  Prepared by: Osmany Crisostomo    Exercises  - Standing Shoulder Flexion Wall Walk  - 1 x daily - 7 x weekly - 3 sets - 10 reps  - Scaption Wall Slide with Towel  - 1 x daily - 7 x weekly - 3 sets - 10 reps  - Standing Shoulder Row with Anchored Resistance  - 1 x daily - 7 x weekly - 3 sets - 10 reps  - Shoulder extension with resistance - Neutral  - 1 x daily - 7 x weekly - 3 sets - 10 reps  - Shoulder Internal Rotation with Resistance  - 1 x daily - 7 x weekly - 3 sets - 10 reps  - Shoulder External Rotation Reactive Isometrics  - 1 x daily - 7 x weekly - 3 sets - 10 reps  - Standing Bicep Curls with Resistance  - 1 x daily - 7 x weekly - 3 sets - 10 reps  - Standing Elbow Extension with Anchored  Resistance  - 1 x daily - 7 x weekly - 3 sets - 10 reps  - Sidelying Shoulder ER with Towel and Dumbbell  - 1 x daily - 7 x weekly - 3 sets - 10 reps  - Sidelying Shoulder Abduction Palm Forward  - 1 x daily - 7 x weekly - 3 sets - 10 reps  - Supine Scapular Protraction in Flexion with Dumbbells  - 1 x daily - 7 x weekly - 3 sets - 10 reps  - Supine Shoulder Flexion Extension Full Range AROM  - 1 x daily - 7 x weekly - 3 sets - 10 reps  Goals:       Time Calculation  Start Time: 0242  Stop Time: 0325  Time Calculation (min): 43 min  PT Therapeutic Procedures Time Entry  Therapeutic Exercise Time Entry: 40

## 2025-06-02 ENCOUNTER — TREATMENT (OUTPATIENT)
Dept: PHYSICAL THERAPY | Facility: HOSPITAL | Age: 30
End: 2025-06-02
Payer: COMMERCIAL

## 2025-06-02 DIAGNOSIS — R29.898 WEAKNESS OF RIGHT SHOULDER: Primary | ICD-10-CM

## 2025-06-02 DIAGNOSIS — M25.511 CHRONIC RIGHT SHOULDER PAIN: ICD-10-CM

## 2025-06-02 DIAGNOSIS — G89.29 CHRONIC RIGHT SHOULDER PAIN: ICD-10-CM

## 2025-06-02 PROCEDURE — 97110 THERAPEUTIC EXERCISES: CPT | Mod: GP | Performed by: PHYSICAL THERAPIST

## 2025-06-02 ASSESSMENT — PAIN - FUNCTIONAL ASSESSMENT: PAIN_FUNCTIONAL_ASSESSMENT: 0-10

## 2025-06-02 ASSESSMENT — PAIN SCALES - GENERAL: PAINLEVEL_OUTOF10: 3

## 2025-06-02 NOTE — PROGRESS NOTES
Physical Therapy    Physical Therapy Treatment    Patient Name: Ronn Dickson  MRN: [id number]  Today's Date: 6/2/2025    Time Entry:   Time Calculation  Start Time: 0415  Stop Time: 0455  Time Calculation (min): 40 min     PT Therapeutic Procedures Time Entry  Therapeutic Exercise Time Entry: 40                   Assessment:  Her AROM about her Rt shoulder is improving.  Some scapular hiking noted with AROM today.  Added shoulder/scapular strengthening today.  Pt given HEP.  No increased pain after therapy today.          Plan:  Continue skilled PT      Current Problem  1. Weakness of right shoulder        2. Chronic right shoulder pain            Subjective  She states her Rt shoulder is a little sore this afternoon.    Precautions  Precautions  Precautions Comment: WBAT and light strengthening    Pain  Pain Assessment  Pain Assessment: 0-10  0-10 (Numeric) Pain Score: 3  Pain Type: Chronic pain  Pain Location: Shoulder  Pain Orientation: Right    Objective :    Rt shoulder flexion to 130 and abduction to 120 degrees    Treatments:    UBE F/R, L2, 3 minutes each way  Wall slides, flexion and scaption, 30 reps  Wall alphabet, 1 rep  Rows and extensions, GTB, 30 reps  IR, GTB, 20-30 reps  ER step outs, YTB, 20 reps  Biceps and Triceps, GTB, 20 reps  Side-lying, ER (1 lb) and abduction (0 lb), 20 reps  Supine, flexion, 20 reps  Supine, chest press with plus, 1 lb, 20 reps  3-way ball on ball, 1.1 lb, 30 reps *  Med ball series, 3-way, grn ball, 20-30 reps   Pranav's, 0 lbs, 20 reps *

## 2025-06-09 ENCOUNTER — TREATMENT (OUTPATIENT)
Dept: PHYSICAL THERAPY | Facility: HOSPITAL | Age: 30
End: 2025-06-09
Payer: COMMERCIAL

## 2025-06-09 DIAGNOSIS — M25.511 CHRONIC RIGHT SHOULDER PAIN: ICD-10-CM

## 2025-06-09 DIAGNOSIS — G89.29 CHRONIC RIGHT SHOULDER PAIN: ICD-10-CM

## 2025-06-09 DIAGNOSIS — R29.898 WEAKNESS OF RIGHT SHOULDER: Primary | ICD-10-CM

## 2025-06-09 PROCEDURE — 97140 MANUAL THERAPY 1/> REGIONS: CPT | Mod: GP,CQ

## 2025-06-09 PROCEDURE — 97110 THERAPEUTIC EXERCISES: CPT | Mod: GP,CQ

## 2025-06-09 ASSESSMENT — PAIN SCALES - GENERAL: PAINLEVEL_OUTOF10: 0 - NO PAIN

## 2025-06-09 ASSESSMENT — PAIN - FUNCTIONAL ASSESSMENT: PAIN_FUNCTIONAL_ASSESSMENT: 0-10

## 2025-06-09 NOTE — PROGRESS NOTES
Physical Therapy Treatment  6/9/2025  Patient Name: Ronn Dickson  MRN: [id number]  Current Problem  1. Weakness of right shoulder        2. Chronic right shoulder pain          Insurance   [employer]  Visit 5  Subjective   Pt with a bit of soreness today after returning to full day of work.  Precautions  Precautions Comment: WBAT and light strengthening  Pain Assessment: 0-10  0-10 (Numeric) Pain Score: 0 - No pain  Objective    Treatments  UBE F/R, 3 minutes  Wall slides, flexion and abduction, 20 reps  Wall alphabet, x 1  Ball on wall series, 20 reps   Supine, scapular protraction, 30 reps  Supine, CW/CCW circles, 30 reps  Side-lying, ER and abduction, 1 lb, 20 reps  Side-lying, h.abduction and flexion, 0 lb, 20 reps  Prone, I/Y/T, 20 reps    Manual  Grade 1 GH joint mobs  PROM R shoulder to pt tolerance    Assessment  Focused primarily on high volume ex's with lower intensity secondary to soreness entering clinic. Denies pain throughout treatment GH control and scapular rhythm improving. Full motion noted passively, some elevation scapula during OH active motion. Added prone I/Y/T, a bit limited mobility into flexion.  Plan:  [1] GH and parascapular strengthening  [2]  [3]  [4]    OP EDUCATION:    Goals:       Time Calculation  Start Time: 0408  Stop Time: 0450  Time Calculation (min): 42 min  PT Therapeutic Procedures Time Entry  Manual Therapy Time Entry: 8  Therapeutic Exercise Time Entry: 30

## 2025-06-16 ENCOUNTER — APPOINTMENT (OUTPATIENT)
Dept: PHYSICAL THERAPY | Facility: HOSPITAL | Age: 30
End: 2025-06-16
Payer: COMMERCIAL

## 2025-06-24 ENCOUNTER — TREATMENT (OUTPATIENT)
Dept: PHYSICAL THERAPY | Facility: HOSPITAL | Age: 30
End: 2025-06-24
Payer: COMMERCIAL

## 2025-06-24 DIAGNOSIS — G89.29 CHRONIC RIGHT SHOULDER PAIN: ICD-10-CM

## 2025-06-24 DIAGNOSIS — M25.511 CHRONIC RIGHT SHOULDER PAIN: ICD-10-CM

## 2025-06-24 DIAGNOSIS — R29.898 WEAKNESS OF RIGHT SHOULDER: Primary | ICD-10-CM

## 2025-06-24 PROCEDURE — 97110 THERAPEUTIC EXERCISES: CPT | Mod: GP,CQ

## 2025-06-24 ASSESSMENT — PAIN SCALES - GENERAL: PAINLEVEL_OUTOF10: 0 - NO PAIN

## 2025-06-24 ASSESSMENT — PAIN - FUNCTIONAL ASSESSMENT: PAIN_FUNCTIONAL_ASSESSMENT: 0-10

## 2025-06-24 NOTE — PROGRESS NOTES
Physical Therapy Treatment  6/24/2025  Patient Name: Ronn Dicksno  MRN: [id number]  Current Problem  1. Weakness of right shoulder        2. Chronic right shoulder pain          Insurance   [employer]  Visit 6  Subjective   Pt feeling pretty good coming in today. Work has been going well. Hasn't really had pain for a while, even without Rx.   Precautions  Precautions Comment: WBAT and light strengthening  Pain Assessment: 0-10  0-10 (Numeric) Pain Score: 0 - No pain  Objective    Treatments  UBE F/R, HL2, 3 minutes each way  Wall slides, flexion and abduction, 1 lb, 30 reps  Wall alphabet, 1 lb, x 1  Ball on wall series, 1 lb, 20 reps   Pranav's, at mirror, 20 reps  Bent over, I/Y/T, 1 lb, 20 reps  Push ups with plus, at wall, 10 reps x 2  Rows and extensions, BTB, 20 reps  B/l, ER and h.abduction, RTB, 20 reps    -NP  Supine, scapular protraction, 30 reps  Supine, CW/CCW circles, 30 reps  Side-lying, ER and abduction, 1 lb, 20 reps  Side-lying, h.abduction and flexion, 0 lb, 20 reps    Assessment  Pt continues to tolerate treatment well. Able to progress with increased intensity majority of ex's. Still with some scapular lifting during abduction. Otherwise, showing good overall GH control and rhythm. Reviewed HEP.    Plan:  [1] GH and parascapular strengthening  [2]  [3]  [4]    OP EDUCATION:    Goals:       Time Calculation  Start Time: 0744  Stop Time: 0825  Time Calculation (min): 41 min  PT Therapeutic Procedures Time Entry  Therapeutic Exercise Time Entry: 40

## 2025-06-27 ENCOUNTER — TELEPHONE (OUTPATIENT)
Dept: PHYSICAL THERAPY | Facility: HOSPITAL | Age: 30
End: 2025-06-27
Payer: COMMERCIAL

## 2025-07-01 ENCOUNTER — TREATMENT (OUTPATIENT)
Dept: PHYSICAL THERAPY | Facility: HOSPITAL | Age: 30
End: 2025-07-01
Payer: COMMERCIAL

## 2025-07-01 DIAGNOSIS — M25.511 CHRONIC RIGHT SHOULDER PAIN: ICD-10-CM

## 2025-07-01 DIAGNOSIS — R29.898 WEAKNESS OF RIGHT SHOULDER: ICD-10-CM

## 2025-07-01 DIAGNOSIS — S42.201A CLOSED FRACTURE OF PROXIMAL END OF RIGHT HUMERUS, UNSPECIFIED FRACTURE MORPHOLOGY, INITIAL ENCOUNTER: ICD-10-CM

## 2025-07-01 DIAGNOSIS — G89.29 CHRONIC RIGHT SHOULDER PAIN: ICD-10-CM

## 2025-07-01 PROCEDURE — 97110 THERAPEUTIC EXERCISES: CPT | Mod: GP | Performed by: PHYSICAL THERAPIST

## 2025-07-01 ASSESSMENT — PAIN SCALES - GENERAL: PAINLEVEL_OUTOF10: 0 - NO PAIN

## 2025-07-01 ASSESSMENT — PAIN - FUNCTIONAL ASSESSMENT: PAIN_FUNCTIONAL_ASSESSMENT: 0-10

## 2025-07-01 NOTE — PROGRESS NOTES
Physical Therapy    Physical Therapy Treatment    Patient Name: Ronn Dickson  MRN: [id number]  Today's Date: 7/1/2025    Time Entry:   Time Calculation  Start Time: 0130  Stop Time: 0210  Time Calculation (min): 40 min     PT Therapeutic Procedures Time Entry  Therapeutic Exercise Time Entry: 40                   Assessment:  Overall, she's doing better and increased Rt shoulder AROM noting decreased scapular hiking.  Her strength and endurance are slowly improving.  No increased pain after treatment today.        Plan:  Continue with skilled PT as needed      Current Problem  1. Closed fracture of proximal end of right humerus, unspecified fracture morphology, initial encounter  Follow Up In Physical Therapy      2. Chronic right shoulder pain  Follow Up In Physical Therapy      3. Weakness of right shoulder  Follow Up In Physical Therapy          Subjective  She states her Rt shoulder is feeling good and no pain.      Precautions  Precautions  Precautions Comment: WBAT and light strengthening    Pain  Pain Assessment  Pain Assessment: 0-10  0-10 (Numeric) Pain Score: 0 - No pain    Objective     Rt shoulder AROM WFL's noting some scapular substitution with flexion and abduction which is improving.      Treatments:    UBE F/R, HL2, 3 minutes each way  Wall slides, flexion and abduction, 1 lb, 30 reps  Wall alphabet, 1 lb, x 1  Ball on wall series, 1 lb, 20 reps   Pranav's, 1 lb, 20 reps  Bent over, I/Y/T, 1 lb, 30 reps  Push ups with plus, at wall, 30 reps   Rows and extensions, RTB, 30 reps  (B) ER and h.abduction, RTB, 30 reps  3-way ball on wall, 1.1 lb, 20-30 reps  3-way medball, 20-30 reps      Side-lying, ER and abduction, 1 lb, 20 reps  Side-lying, h.abduction and flexion, 0 lb, 20 reps

## 2025-07-16 ENCOUNTER — TREATMENT (OUTPATIENT)
Dept: PHYSICAL THERAPY | Facility: HOSPITAL | Age: 30
End: 2025-07-16
Payer: COMMERCIAL

## 2025-07-16 DIAGNOSIS — R29.898 WEAKNESS OF RIGHT SHOULDER: ICD-10-CM

## 2025-07-16 DIAGNOSIS — G89.29 CHRONIC RIGHT SHOULDER PAIN: ICD-10-CM

## 2025-07-16 DIAGNOSIS — S42.201A CLOSED FRACTURE OF PROXIMAL END OF RIGHT HUMERUS, UNSPECIFIED FRACTURE MORPHOLOGY, INITIAL ENCOUNTER: ICD-10-CM

## 2025-07-16 DIAGNOSIS — M25.511 CHRONIC RIGHT SHOULDER PAIN: ICD-10-CM

## 2025-07-16 PROCEDURE — 97110 THERAPEUTIC EXERCISES: CPT | Mod: GP | Performed by: PHYSICAL THERAPIST

## 2025-07-16 ASSESSMENT — PAIN SCALES - GENERAL: PAINLEVEL_OUTOF10: 0 - NO PAIN

## 2025-07-16 ASSESSMENT — PAIN - FUNCTIONAL ASSESSMENT: PAIN_FUNCTIONAL_ASSESSMENT: 0-10

## 2025-07-16 NOTE — PROGRESS NOTES
Physical Therapy    Physical Therapy Treatment    Patient Name: Ronn Dickson  MRN: [id number]  Today's Date: 7/16/2025    Time Entry:   Time Calculation  Start Time: 0325  Stop Time: 0410  Time Calculation (min): 45 min     PT Therapeutic Procedures Time Entry  Therapeutic Exercise Time Entry: 43                   Assessment:  Her Rt shoulder AROM and strength are slowly progressing.  She tolerated some mild increase in weight/resistance with her exercises today.  No increased pain after treatment today.        Plan:  Recheck on her next visit      Current Problem  1. Closed fracture of proximal end of right humerus, unspecified fracture morphology, initial encounter  Follow Up In Physical Therapy      2. Chronic right shoulder pain  Follow Up In Physical Therapy      3. Weakness of right shoulder  Follow Up In Physical Therapy            Subjective  She states her Rt shoulder is feeling good and no pain just weak.      Precautions  Precautions  Precautions Comment: WBAT and light strengthening    Pain  Pain Assessment  Pain Assessment: 0-10  0-10 (Numeric) Pain Score: 0 - No pain    Objective     Rt shoulder AROM WFL's noting some scapular hiking with flexion and abduction    Rt shoulder 3+/5 strength with flexion and abduction      Treatments:    UBE F/R, L3, 3 minutes each way  Wall slides, flexion and abduction, 2 lb, 30 reps  Wall alphabet, 2 lb, x 1  Ball on wall series, 2.2 lb, 20 reps   Pranav's, 1 lb, 20 reps  Bent over, I/Y/T, 1 lb, 30 reps  Push ups with plus, inclined, 30 reps   Rows and extensions, GTB, 30 reps  (B) ER and h.abduction, RTB, 30 reps  3-way medball, 2.2 lb, 20-30 reps      Side-lying, ER and abduction, 1 lb, 20 reps  Side-lying, h.abduction and flexion, 0 lb, 20 reps

## 2025-07-22 ENCOUNTER — APPOINTMENT (OUTPATIENT)
Dept: ORTHOPEDIC SURGERY | Facility: CLINIC | Age: 30
End: 2025-07-22
Payer: COMMERCIAL

## 2025-07-24 DIAGNOSIS — S42.201A CLOSED FRACTURE OF PROXIMAL END OF RIGHT HUMERUS, UNSPECIFIED FRACTURE MORPHOLOGY, INITIAL ENCOUNTER: Primary | ICD-10-CM

## 2025-07-28 NOTE — PROGRESS NOTES
History of Present Illness   No chief complaint on file.      Patient presents today for follow up evaluation of side: right upper extremity fracture.  The patient has a right proximal humerus fracture.  The patient has been treated nonoperatively.  The patient is approximately 6 months from her injury.  She has no pain  She has been doing outpatient physical therapy and recently started strengthening     Medical History[1]    Medication Documentation Review Audit       Reviewed by Radha Luu on 04/22/25 at 0813      Medication Order Taking? Sig Documenting Provider Last Dose Status   buPROPion SR (Wellbutrin SR) 150 mg 12 hr tablet [id number]  Take 150 mg by mouth once daily. Historical Provider, MD  Active   metFORMIN (Glucophage) 500 mg tablet [id number]  Take 1,000 mg by mouth 2 times daily (morning and late afternoon). Historical Provider, MD  Active                    RX Allergies[2]    Social History     Socioeconomic History    Marital status: Single     Spouse name: Not on file    Number of children: Not on file    Years of education: Not on file    Highest education level: Not on file   Occupational History    Not on file   Tobacco Use    Smoking status: Never    Smokeless tobacco: Never   Substance and Sexual Activity    Alcohol use: Not on file    Drug use: Never    Sexual activity: Not on file   Other Topics Concern    Not on file   Social History Narrative    Not on file     Social Drivers of Health     Financial Resource Strain: Low Risk  (4/1/2025)    Received from [facility]    Overall Financial Resource Strain (CARDIA)     Difficulty of Paying Living Expenses: Not hard at all   Food Insecurity: No Food Insecurity (4/1/2025)    Received from [facility]    Hunger Vital Sign     Within the past 12 months, you worried that your food would run out before you got the money to buy more.: Never true     Within the past 12 months, the food you bought just didn't last and you didn't have  money to get more.: Never true   Transportation Needs: No Transportation Needs (4/1/2025)    Received from [facility]    PRAPARE - Transportation     Lack of Transportation (Medical): No     Lack of Transportation (Non-Medical): No   Physical Activity: Insufficiently Active (4/1/2025)    Received from [facility]    Exercise Vital Sign     On average, how many days per week do you engage in moderate to strenuous exercise (like a brisk walk)?: 2 days     On average, how many minutes do you engage in exercise at this level?: 30 min   Stress: Stress Concern Present (4/1/2025)    Received from [facility]    [employer] [place] [employer] - Occupational Stress Questionnaire     Feeling of Stress : To some extent   Social Connections: Moderately Isolated (4/1/2025)    Received from [facility]    Social Connection and Isolation Panel     In a typical week, how many times do you talk on the phone with family, friends, or neighbors?: More than three times a week     How often do you get together with friends or relatives?: Once a week     How often do you attend [employer] or Alev[employer] services?: Never     Do you belong to any clubs or organizations such as [employer] groups, unions, fraternal or athletic groups, or school groups?: No     How often do you attend meetings of the clubs or organizations you belong to?: Never     Are you , , , , never , or living with a partner?: Living with partner   Intimate Partner Violence: Not on file   Housing Stability: Low Risk  (2/5/2024)    Received from [facility]    Housing Stability Vital Sign     Unable to Pay for Housing in the Last Year: No     Number of Places Lived in the Last Year: 1     Unstable Housing in the Last Year: No       Surgical History[3]       Review of Systems   GENERAL: Negative  GI: Negative  MUSCULOSKELETAL: See HPI  SKIN: Negative  NEURO:  Negative     Physical Exam:  side: right upper  extremity:  Skin healthy to gross inspection, no breakdown  Swelling / ecchymosis noted  Intact flexion and extension of 1st IP joint and finger abduction  Sensation intact to light touch medial / ulnar and radial nerve distribution   Good cap refill  Fracture moves as 1  Range of motion is forward flexion 180 degrees internal rotation to T6, external rotation and 90 degrees abduction and 90 degrees  No pain with stressing the cuff on Jobes test or external rotation test     Imaging  See dictated report from today       Assessment   Right proximal humerus fracture with clinical and radiographic evidence of union     Plan:  Resume activities as tolerated  Follow-up as needed  All questions answered                     [1] No past medical history on file.  [2]   Allergies  Allergen Reactions    Sulfa (Sulfonamide Antibiotics) Hives   [3] No past surgical history on file.

## 2025-07-29 ENCOUNTER — APPOINTMENT (OUTPATIENT)
Dept: ORTHOPEDIC SURGERY | Facility: CLINIC | Age: 30
End: 2025-07-29
Payer: COMMERCIAL

## 2025-07-29 ENCOUNTER — HOSPITAL ENCOUNTER (OUTPATIENT)
Dept: RADIOLOGY | Facility: HOSPITAL | Age: 30
Discharge: HOME | End: 2025-07-29
Payer: COMMERCIAL

## 2025-07-29 DIAGNOSIS — S42.201A CLOSED FRACTURE OF PROXIMAL END OF RIGHT HUMERUS, UNSPECIFIED FRACTURE MORPHOLOGY, INITIAL ENCOUNTER: Primary | ICD-10-CM

## 2025-07-29 DIAGNOSIS — S42.201A CLOSED FRACTURE OF PROXIMAL END OF RIGHT HUMERUS, UNSPECIFIED FRACTURE MORPHOLOGY, INITIAL ENCOUNTER: ICD-10-CM

## 2025-07-29 PROCEDURE — 73030 X-RAY EXAM OF SHOULDER: CPT | Mod: RT

## 2025-07-29 PROCEDURE — 1036F TOBACCO NON-USER: CPT | Performed by: ORTHOPAEDIC SURGERY

## 2025-07-29 PROCEDURE — 99213 OFFICE O/P EST LOW 20 MIN: CPT | Performed by: ORTHOPAEDIC SURGERY

## 2025-07-29 PROCEDURE — 73030 X-RAY EXAM OF SHOULDER: CPT | Mod: RIGHT SIDE | Performed by: RADIOLOGY

## 2025-07-30 ENCOUNTER — TREATMENT (OUTPATIENT)
Dept: PHYSICAL THERAPY | Facility: HOSPITAL | Age: 30
End: 2025-07-30
Payer: COMMERCIAL

## 2025-07-30 DIAGNOSIS — M25.511 CHRONIC RIGHT SHOULDER PAIN: Primary | ICD-10-CM

## 2025-07-30 DIAGNOSIS — R29.898 WEAKNESS OF RIGHT SHOULDER: ICD-10-CM

## 2025-07-30 DIAGNOSIS — G89.29 CHRONIC RIGHT SHOULDER PAIN: Primary | ICD-10-CM

## 2025-07-30 PROCEDURE — 97110 THERAPEUTIC EXERCISES: CPT | Mod: GP | Performed by: PHYSICAL THERAPIST

## 2025-07-30 ASSESSMENT — PAIN SCALES - GENERAL: PAINLEVEL_OUTOF10: 0 - NO PAIN

## 2025-07-30 ASSESSMENT — PAIN - FUNCTIONAL ASSESSMENT: PAIN_FUNCTIONAL_ASSESSMENT: 0-10

## 2025-07-30 NOTE — PROGRESS NOTES
Physical Therapy    Physical Therapy Treatment    Patient Name: Ronn Dickson  MRN: [id number]  Today's Date: 7/30/2025    Time Entry:   Time Calculation  Start Time: 0330  Stop Time: 0415  Time Calculation (min): 45 min     PT Therapeutic Procedures Time Entry  Therapeutic Exercise Time Entry: 43                   Assessment:  She's been seen for 10 PT treatments.  She's having no Rt shoulder pain and her function has improved.  Her Rt shoulder AROM WNL's and pain free.  4+/5 gross Rt shoulder strength.  No tenderness to her Rt shoulder.  No difficulty with her instrumental ADL's and work activities around the house.  She's going to continue with her HEP.          Plan:  Discharge      Current Problem  1. Chronic right shoulder pain        2. Weakness of right shoulder            Subjective  She states her shoulder is feeling good and her strength and endurance are slowly improving.    Precautions  Precautions  Precautions Comment: WBAT and light    Pain  Pain Assessment  Pain Assessment: 0-10  0-10 (Numeric) Pain Score: 0 - No pain    Objective     Rt shoulder AROM WNL's and pain free.  Minimal Rt scapular hiking with flexion and abduction    Rt shoulder 4+/5 grossly throughout      Outcome Measures:  Other Measures  Disability of Arm Shoulder Hand (DASH): 6.82%    Treatments:    UBE F/R, L3, 3 minutes each way  Wall slides, flexion and abduction, 2 lb, 30 reps  Wall alphabet, 2 lb, x 1  Ball on wall series, 2.2 lb, 20 reps   Pranav's, 2 lb, 30 reps  Bent over, I/Y/T, 1 lb, 30 reps  Push ups with plus, inclined, 25 reps   Rows and extensions, GTB, 30 reps  (B) ER and h.abduction, RTB, 30 reps  3-way medball, 2.2 lb, 20-30 reps      Side-lying, ER and abduction, 1 lb, 20 reps  Side-lying, h.abduction and flexion, 0 lb, 20 reps    Goals:    No Rt shoulder pain.  Met  Pain free WFL's Rt shoulder AROM.  Met  5/5 Rt shoulder/scapular strength grossly throughout.  Partially met  No tenderness about Rt shoulder.  Met  No  pain or difficulty performing basic/instrumental ADL's and work activities around the house.  Met  Independent with HEP.  Met